# Patient Record
Sex: MALE | Race: WHITE | NOT HISPANIC OR LATINO | Employment: STUDENT | URBAN - METROPOLITAN AREA
[De-identification: names, ages, dates, MRNs, and addresses within clinical notes are randomized per-mention and may not be internally consistent; named-entity substitution may affect disease eponyms.]

---

## 2020-10-28 ENCOUNTER — HOSPITAL ENCOUNTER (EMERGENCY)
Facility: OTHER | Age: 20
Discharge: HOME | End: 2020-10-28
Attending: EMERGENCY MEDICINE
Payer: COMMERCIAL

## 2020-10-28 VITALS
TEMPERATURE: 97 F | HEIGHT: 78 IN | BODY MASS INDEX: 20.83 KG/M2 | SYSTOLIC BLOOD PRESSURE: 128 MMHG | RESPIRATION RATE: 18 BRPM | DIASTOLIC BLOOD PRESSURE: 70 MMHG | WEIGHT: 180 LBS | OXYGEN SATURATION: 99 % | HEART RATE: 100 BPM

## 2020-10-28 DIAGNOSIS — J02.0 STREP PHARYNGITIS: Primary | ICD-10-CM

## 2020-10-28 LAB — GROUP A STREP, MOLECULAR: POSITIVE

## 2020-10-28 PROCEDURE — 63600175 PHARM REV CODE 636 W HCPCS: Performed by: EMERGENCY MEDICINE

## 2020-10-28 PROCEDURE — 99284 EMERGENCY DEPT VISIT MOD MDM: CPT | Mod: 59,25

## 2020-10-28 PROCEDURE — 96372 THER/PROPH/DIAG INJ SC/IM: CPT

## 2020-10-28 PROCEDURE — 25000003 PHARM REV CODE 250: Performed by: EMERGENCY MEDICINE

## 2020-10-28 PROCEDURE — 87651 STREP A DNA AMP PROBE: CPT

## 2020-10-28 RX ORDER — IBUPROFEN 400 MG/1
800 TABLET ORAL
Status: COMPLETED | OUTPATIENT
Start: 2020-10-28 | End: 2020-10-28

## 2020-10-28 RX ORDER — DEXAMETHASONE SODIUM PHOSPHATE 4 MG/ML
8 INJECTION, SOLUTION INTRA-ARTICULAR; INTRALESIONAL; INTRAMUSCULAR; INTRAVENOUS; SOFT TISSUE
Status: COMPLETED | OUTPATIENT
Start: 2020-10-28 | End: 2020-10-28

## 2020-10-28 RX ORDER — IBUPROFEN 800 MG/1
800 TABLET ORAL EVERY 6 HOURS PRN
Qty: 20 TABLET | Refills: 0 | Status: SHIPPED | OUTPATIENT
Start: 2020-10-28

## 2020-10-28 RX ADMIN — PENICILLIN G BENZATHINE 2.4 MILLION UNITS: 1200000 INJECTION, SUSPENSION INTRAMUSCULAR at 12:10

## 2020-10-28 RX ADMIN — DEXAMETHASONE SODIUM PHOSPHATE 8 MG: 4 INJECTION, SOLUTION INTRA-ARTICULAR; INTRALESIONAL; INTRAMUSCULAR; INTRAVENOUS; SOFT TISSUE at 12:10

## 2020-10-28 RX ADMIN — IBUPROFEN 800 MG: 400 TABLET, FILM COATED ORAL at 12:10

## 2020-10-28 NOTE — ED PROVIDER NOTES
Encounter Date: 10/28/2020    SCRIBE #1 NOTE: I, Марина Heath, am scribing for, and in the presence of, Dr. Anne.       History     Chief Complaint   Patient presents with    Sore Throat     sore throat x several days      Time seen by provider: 11:48 AM    This is a 20 y.o. male who presents with complaint of sore throat for the past 3 days. He reports associated fever, cough, and nasal congestion. He denies any N/V/D. He does not have any major medical problems. He denies additional complaints at this time.    The history is provided by the patient.     Review of patient's allergies indicates:   Allergen Reactions    Coconut      HIVES     Shellfish containing products      HIVES      No past medical history on file.  No past surgical history on file.  No family history on file.  Social History     Tobacco Use    Smoking status: Not on file   Substance Use Topics    Alcohol use: Not on file    Drug use: Not on file     Review of Systems   Constitutional: Positive for fever.   HENT: Positive for congestion and sore throat.    Respiratory: Positive for cough. Negative for shortness of breath.    Cardiovascular: Negative for chest pain.   Gastrointestinal: Negative for abdominal pain, diarrhea, nausea and vomiting.   Genitourinary: Negative for dysuria.   Musculoskeletal: Negative for myalgias.   Skin: Negative for rash.   Neurological: Negative for dizziness, weakness and headaches.   Psychiatric/Behavioral: Negative for confusion.       Physical Exam     Initial Vitals [10/28/20 1053]   BP Pulse Resp Temp SpO2   134/80 100 19 97.3 °F (36.3 °C) 96 %      MAP       --         Physical Exam    Nursing note and vitals reviewed.  Constitutional: He appears well-developed and well-nourished.   HENT:   Head: Normocephalic and atraumatic.   Erythematous swollen posterior oropharynx with exudates.   Eyes: Conjunctivae and EOM are normal.   Neck: Normal range of motion. Neck supple.   Pulmonary/Chest: No respiratory  distress.   Musculoskeletal: Normal range of motion.   Neurological: He is alert and oriented to person, place, and time.   Ambulatory with steady gait.   Skin: Skin is warm and dry. Capillary refill takes less than 2 seconds. No rash noted.   Psychiatric: Judgment and thought content normal.         ED Course   Procedures  Labs Reviewed   GROUP A STREP, MOLECULAR - Abnormal; Notable for the following components:       Result Value    Group A Strep, Molecular Positive (*)     All other components within normal limits             Medical Decision Making:   History:   Old Medical Records: I decided to obtain old medical records.  Old Records Summarized: other records.  Initial Assessment:   11:48AM:  Patient is a 20-year-old male presents to the emergency department with a sore throat.  Patient appears well, nontoxic.  He is tolerating secretions with no issues.  He has no trismus.  His strep test from the waiting room was positive, likely etiology of his symptoms.  I updated the patient regarding results.  Patient elects for IM penicillin versus p.o..  Will also plan for IM steroids and NSAIDs.  I do not feel that further workup in the emergency department is indicated at this time.  Will plan to provide a prescription for high-dose NSAIDs to take as needed.  I counseled patient regarding supportive care measures.  I have discussed with the pt ED return warnings and need for close PCP f/u.  Pt agreeable to plan and all questions answered.  I feel that pt is stable for discharge and management as an outpatient and no further intervention is needed at this time.  Pt is comfortable returning to the ED if needed.  Will DC home in stable condition.      Clinical Tests:   Lab Tests: Ordered and Reviewed            Scribe Attestation:   Scribe #1: I performed the above scribed service and the documentation accurately describes the services I performed. I attest to the accuracy of the note.    Attending Attestation:            Physician Attestation for Scribe:  Physician Attestation Statement for Scribe #1: I, Dr. Anne, reviewed documentation, as scribed by Марина Heath in my presence, and it is both accurate and complete.                           Clinical Impression:     1. Strep pharyngitis            ED Disposition Condition    Discharge Stable        ED Prescriptions     Medication Sig Dispense Start Date End Date Auth. Provider    ibuprofen (ADVIL,MOTRIN) 800 MG tablet Take 1 tablet (800 mg total) by mouth every 6 (six) hours as needed for Pain. 20 tablet 10/28/2020  Jordyn Anne MD        Follow-up Information     Follow up With Specialties Details Why Contact Info    Primary Care Physician                                           Jordyn Anne MD  10/28/20 3810

## 2020-10-28 NOTE — FIRST PROVIDER EVALUATION
Emergency Department TeleTriage Encounter Note      CHIEF COMPLAINT    Chief Complaint   Patient presents with    Sore Throat     sore throat x several days        VITAL SIGNS   Initial Vitals [10/28/20 1053]   BP Pulse Resp Temp SpO2   134/80 100 19 97.3 °F (36.3 °C) 96 %      MAP       --            ALLERGIES    Review of patient's allergies indicates:   Allergen Reactions    Coconut      HIVES     Shellfish containing products      HIVES        PROVIDER TRIAGE NOTE  Patient is a 20 y.o. male presenting for sore throat x3 days.  Patient states that he felt like he had a fever yesterday but is since resolved.  He admits that 1 of his friends was recently diagnosed with strep throat.  Strep ordered.      ORDERS  Labs Reviewed   GROUP A STREP, MOLECULAR       ED Orders (720h ago, onward)    Start Ordered     Status Ordering Provider    10/28/20 1103 10/28/20 1102  Group A Strep, Molecular  STAT  Collect    Ordered DAPHNE MARTINEZ            Virtual Visit Note: The provider triage portion of this emergency department evaluation and documentation was performed via [employer], a HIPAA-compliant telemedicine application, in concert with a tele-presenter in the room. A face to face patient evaluation with one of my colleagues will occur once the patient is placed in an emergency department room.      DISCLAIMER: This note was prepared with [employer] voice recognition transcription software. Garbled syntax, mangled pronouns, and other bizarre constructions may be attributed to that software system.

## 2020-10-28 NOTE — DISCHARGE INSTRUCTIONS
We have prescribed you medication for pain and/or inflammation. Please fill and take as directed.      Please return to the ER if you have chest pain, difficulty breathing, fevers, altered mental status, dizziness, weakness, or any other concerns.      Follow up with your primary care physician.

## 2020-10-28 NOTE — ED TRIAGE NOTES
Pt presents for c/o a sore throat x 2 days. Pt states that they have not been able to eat for the past 2 days as well. White patches are noted to the back of the throat as well as it being red. NADN. AAOx4

## 2020-10-29 ENCOUNTER — HOSPITAL ENCOUNTER (EMERGENCY)
Facility: OTHER | Age: 20
Discharge: HOME | End: 2020-10-29
Attending: EMERGENCY MEDICINE
Payer: COMMERCIAL

## 2020-10-29 VITALS
HEART RATE: 96 BPM | OXYGEN SATURATION: 95 % | SYSTOLIC BLOOD PRESSURE: 150 MMHG | TEMPERATURE: 98 F | DIASTOLIC BLOOD PRESSURE: 93 MMHG | BODY MASS INDEX: 21.98 KG/M2 | WEIGHT: 190 LBS | RESPIRATION RATE: 20 BRPM | HEIGHT: 78 IN

## 2020-10-29 DIAGNOSIS — J02.0 STREP PHARYNGITIS: Primary | ICD-10-CM

## 2020-10-29 PROCEDURE — 63600175 PHARM REV CODE 636 W HCPCS: Performed by: PHYSICIAN ASSISTANT

## 2020-10-29 PROCEDURE — 96372 THER/PROPH/DIAG INJ SC/IM: CPT

## 2020-10-29 PROCEDURE — 99284 EMERGENCY DEPT VISIT MOD MDM: CPT | Mod: 25

## 2020-10-29 RX ORDER — KETOROLAC TROMETHAMINE 30 MG/ML
30 INJECTION, SOLUTION INTRAMUSCULAR; INTRAVENOUS
Status: COMPLETED | OUTPATIENT
Start: 2020-10-29 | End: 2020-10-29

## 2020-10-29 RX ADMIN — KETOROLAC TROMETHAMINE 30 MG: 30 INJECTION, SOLUTION INTRAMUSCULAR; INTRAVENOUS at 11:10

## 2020-10-29 NOTE — DISCHARGE INSTRUCTIONS
Take 2 extra-strength Tylenol for your pain.  Wait 3 hr, then take 1 of the high-dose ibuprofen.  Wait 3 hr, then take 2 extra-strength Tylenol. Continue this so that you can achieve pain control.

## 2020-10-29 NOTE — ED TRIAGE NOTES
Pt reports dx with strept throat yesterday. Reports today pain worse. Reports difficulty swallowing and feeling the need to spit out secretions. No ss/ of resp distress right now. Reports tylenol this morning with no relief.

## 2020-10-29 NOTE — ED PROVIDER NOTES
"Encounter Date: 10/29/2020       History     Chief Complaint   Patient presents with    Sore Throat     Patient states seen here yesterday and tested positive for strep.  Patient states was told to come back if not feeling better and patient states feeling "exponentially worse."       Patient is a 20-year-old male presenting to the emergency department for evaluation of a sore throat.  The patient admits he was seen in the emergency department yesterday where he tested positive for strep throat.  He states he was given 4 shots and told to come back to the hospital he did not feel any better.  He does feel any better now.  He did take 1 dose of 500 mg of Tylenol prior to arrival.  He has not picked up his prescription for ibuprofen yet. This is the extent of the patient's complaints at this time.       The history is provided by the patient.     Review of patient's allergies indicates:   Allergen Reactions    Coconut      HIVES     Shellfish containing products      HIVES      No past medical history on file.  No past surgical history on file.  No family history on file.  Social History     Tobacco Use    Smoking status: Not on file   Substance Use Topics    Alcohol use: Not on file    Drug use: Not on file     Review of Systems   Constitutional: Negative for activity change, chills, fatigue and fever.   HENT: Positive for sore throat. Negative for congestion and rhinorrhea.    Eyes: Negative for photophobia and visual disturbance.   Respiratory: Negative for cough and shortness of breath.    Cardiovascular: Negative for chest pain.   Gastrointestinal: Negative for abdominal pain, diarrhea, nausea and vomiting.   Genitourinary: Negative for dysuria, hematuria and urgency.   Musculoskeletal: Negative for back pain, myalgias and neck pain.   Skin: Negative for color change and wound.   Neurological: Negative for weakness and headaches.   Psychiatric/Behavioral: Negative for agitation and confusion.       Physical " Exam     Initial Vitals [10/29/20 1108]   BP Pulse Resp Temp SpO2   130/83 97 20 98.4 °F (36.9 °C) 96 %      MAP       --         Physical Exam    Nursing note and vitals reviewed.  Constitutional: Vital signs are normal. He appears well-developed and well-nourished. He is not diaphoretic.  Non-toxic appearance. He does not have a sickly appearance. No distress.   Well-appearing,  male accompanied the emergency room.  Speaking clearly full sentences.  No acute distress.   HENT:   Head: Normocephalic and atraumatic.   Right Ear: External ear normal.   Left Ear: External ear normal.   Nose: Nose normal.   Mouth/Throat: Uvula is midline. No tonsillar abscesses.   Posterior oropharyngeal exudate with edema and erythema noted.  No lingual elevation or trismus.  No uvular deviation.  Swallowing handling oral secretions without difficulty.  No evidence of posterior oropharyngeal abscess.   Eyes: Conjunctivae and EOM are normal.   Neck: Normal range of motion. Neck supple.   Cardiovascular: Normal rate, regular rhythm and normal heart sounds.   Pulmonary/Chest: Breath sounds normal. No respiratory distress. He has no wheezes.   Abdominal: Soft. Bowel sounds are normal. He exhibits no distension. There is no abdominal tenderness. There is no rebound and no guarding.   Musculoskeletal: Normal range of motion.   Neurological: He is alert and oriented to person, place, and time.   Skin: Skin is warm.   Psychiatric: He has a normal mood and affect. His behavior is normal. Judgment and thought content normal.         ED Course   Procedures  Labs Reviewed - No data to display          Medical Decision Making:   Initial Assessment:     Urgent evaluation a 20-year-old male presenting to the emergency department for evaluation of strep pharyngitis.  Patient is afebrile, nontoxic appearing, hemodynamically stable.  Physical exam reveals posterior oropharyngeal erythema, edema, exudate.  Swallowing handling oral secretions  without difficulty.  Vital signs are unremarkable. The patient has erythema without tonsillar swelling or exudate. There is no uvula deviation to suggest peritonsillar abscess. The patient has normal phonation with no trismus to suggest retropharyngeal abscess. There is no hoarseness, difficulty handling secretions, or facial swelling to suggest peritonsillar abscess, retropharyngeal abscess, epiglottitis, or airway compromise.  Reviewed patient's medical record.  He received penicillin and Decadron yesterday.    ED Management:    Patient is given a shot of Toradol in the emergency room.  I explained that he is under dosing his home medication.  Recommended he take 1 g of Tylenol followed by 800 mg of ibuprofen, alternating every 3 hours to achieve pain control.  There is no evidence of posterior oropharyngeal abscess or other acute process.  Patient was given these instructions.  Voiced understanding.  He is discharged home in stable condition.The patient was instructed to follow up with a primary care provider in 2 days or to return to the emergency department for worsening symptoms. The treatment plan was discussed with the patient who demonstrated understanding and comfort with plan.                               Clinical Impression:       ICD-10-CM ICD-9-CM   1. Strep pharyngitis  J02.0 034.0                          ED Disposition Condition    Discharge Stable        ED Prescriptions     None        Follow-up Information     Follow up With Specialties Details Why Contact Info Additional Information    [facility] Emergency Medicine  If symptoms worsen [address] [id number] [phone]     [facility] Internal Med-[facility] [address] Internal [address]   [address] [id number] [phone] Internal Medicine - [facility], 8th Floor, [address] Please park in [address] and use [place] elevators                                        Tonya Ritter PA-C  10/29/20 1146